# Patient Record
(demographics unavailable — no encounter records)

---

## 2017-03-18 NOTE — EDM.PDOC
ED HPI GENERAL MEDICAL PROBLEM





- General


Chief Complaint: ENT Problem


Stated Complaint: EAR INFECTION


Time Seen by Provider: 03/18/17 17:33





- History of Present Illness


INITIAL COMMENTS - FREE TEXT/NARRATIVE: 





HISTORY AND PHYSICAL:





History of present illness:


The patient is a 33 or female with a history of cough nonproductive of phlegm 

for one week some nasal drainage and sore throat all of which have improved but 

now she has bilateral ear pain left greater than right the last 2 days with 

episodic vomiting over the last 2-3 days. She is tolerating by mouth she states 

she only episodically vomiting she has no diarrhea or abdominal pain. She has 

no chest pain or shortness of breath. She has no headache or current sinus 

drainage and she is mostly concerned about bilateral ear pain. The patient has 

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 

in the past but does not want to have that surgically corrected. Patient denies 

any neck pain swelling or headache. Patient did not get her influenza shot this 

year but she does follow with Dr. De Dios at [facility].


The patient states she's not using protection and is unsure of last period and 

would like a pregnancy test


Review of systems: 


As per history of present illness and below otherwise all systems reviewed and 

negative.





Past medical history: 


As per history of present illness and as reviewed below otherwise 

noncontributory.





Surgical history: 


As per history of present illness and as reviewed below otherwise 

noncontributory.





Social history: 


No reported history of drug or alcohol abuse.





Family history: 


As per history of present illness and as reviewed below otherwise 

noncontributory.





Physical exam:


General: Well-developed well-nourished female who is overweight nontoxic and 

speaking clearly and easily in the ED. She is not breathless or should nasal 

quality to voice.


HEENT: Atraumatic, normocephalic, pupils reactive, negative for conjunctival 

pallor or scleral icterus, mucous membranes moist, throat clear, neck supple, 

nontender, trachea midline. There is no cervical adenopathy or nuchal rigidity. 

The right TM has a chronic appearing perforation in the superior aspect and 

there is no gross drainage or external canal swelling and the TM is not 

reddened. There is no mastoid tenderness bilaterally. The left TM is beefy-red 

in appearance and bulging but there is no fluid or debris in external canal nor 

is there any gross edema.


Lungs: Clear to auscultation, breath sounds equal bilaterally, chest nontender.


Heart: S1S2, regular rhythm and slightly tachycardic rate of my evaluation no 

overt murmurs


Abdomen: Soft, nondistended, nontender. NABS.


Genitourinary: Deferred.


Rectal: Deferred.


Extremities: Atraumatic, negative for cords or calf pain. Neurovascular 

unremarkable.


Neuro: Awake, alert, oriented. Cranial nerves II through XII unremarkable. 

Cerebellum unremarkable. Motor and sensory unremarkable throughout. Exam 

nonfocal.





Diagnostics:


Urine pregnancy test


I offered the patient an influenza swabbut as her symptoms are one week of 

duration I told her that I would not be able to treata + test  and she should 

like to defer





Therapeutics:








Impression: 


Left otitis media, chronic right TM perforation with URI symptoms





Definitive disposition and diagnosis as appropriate pending reevaluation and 

review of above.


Treatments PTA: Reports: NSAIDS


Other Treatments PTA: ibuprofen an hour ago


  ** bilateral ears


Pain Score (Numeric/FACES): 10





- Related Data


 Allergies











Allergy/AdvReac Type Severity Reaction Status Date / Time


 


Penicillins Allergy  Rash Verified 03/18/17 17:46











Home Meds: 


 Home Meds





Citalopram Hydrobromide [Celexa] 20 mg PO DAILY 12/29/16 [History]











Past Medical History


Other HEENT History: has upper dental bridge


Cardiovascular History: Reports: None


Respiratory History: Reports: None


Gastrointestinal History: Reports: None


Genitourinary History: Reports: None


OB/GYN History: Reports: Pregnancy


Musculoskeletal History: Reports: None


Neurological History: Reports: None


Psychiatric History: Reports: Anxiety, Depression


Endocrine/Metabolic History: Reports: Obesity/BMI 30+


Hematologic History: Reports: None


Immunologic History: Reports: None


Oncologic (Cancer) History: Reports: None


Dermatologic History: Reports: None





- Infectious Disease History


Infectious Disease History: Reports: None





- Past Surgical History


Head Surgeries/Procedures: Reports: None


HEENT Surgical History: Reports: Adenoidectomy, Tonsillectomy


Cardiovascular Surgical History: Reports: None


Respiratory Surgical History: Reports: None


GI Surgical History: Reports: None


Female  Surgical History: Reports: None


Endocrine Surgical History: Reports: None


Neurological Surgical History: Reports: None


Musculoskeletal Surgical History: Reports: None


Oncologic Surgical History: Reports: None





Social & Family History





- Family History


Family Medical History: Noncontributory





- Tobacco Use


Smoking Status *Q: Current Some Day Smoker


Years of Tobacco use: 15


Packs/Tins Daily: 0.5


Used Tobacco, but Quit: Yes





- Recreational Drug Use


Recreational Drug Use: No


Drug Use in Last 12 Months: No





ED ROS GENERAL





- Review of Systems


Review Of Systems: ROS reveals no pertinent complaints other than HPI.





ED EXAM, GENERAL





- Physical Exam


Exam: See Below (See dictation)





Course





- Vital Signs


Last Recorded V/S: 


 Last Vital Signs











Temp  36.8 C   03/18/17 17:48


 


Pulse  113 H  03/18/17 17:48


 


Resp  16   03/18/17 17:48


 


BP  119/70   03/18/17 17:48


 


Pulse Ox  96   03/18/17 17:48














- Orders/Labs/Meds


Orders: 


 Active Orders 24 hr











 Category Date Time Status


 


 HCG QUALITATIVE,URINE [URCHEM] Stat Lab  03/18/17 18:05 Ordered














Departure





- Departure


Time of Disposition: 18:21


Disposition: Home, Self-Care 01


Condition: good


Clinical Impression: 


Otitis media


Qualifiers:


 Otitis media type: unspecified Laterality: left Chronicity: unspecified 

Qualified Code(s): H66.92 - Otitis media, unspecified, left ear





Upper respiratory tract infection


Qualifiers:


 URI type: unspecified viral URI Qualified Code(s): J06.9 - Acute upper 

respiratory infection, unspecified





Referrals: 


Charles De Dios MD [Primary Care Provider] - 


Forms:  ED Department Discharge


Additional Instructions: 


The following information is given to patients seen in the emergency department 

who are being discharged to home. This information is to outline your options 

for follow-up care. We provide all patients seen in our emergency department 

with a follow-up referral.





The need for follow-up, as well as the timing and circumstances, are variable 

depending upon the specifics of your emergency department visit.





If you don't have a primary care physician on staff, we will provide you with a 

referral. We always advise you to contact your personal physician following an 

emergency department visit to inform them of the circumstance of the visit and 

for follow-up with them and/or the need for any referrals to a consulting 

specialist.





The emergency department will also refer you to a specialist when appropriate. 

This referral assures that you have the opportunity for followup care with a 

specialist. All of these measure are taken in an effort to provide you with 

optimal care, which includes your followup.





Under all circumstances we always encourage you to contact your private 

physician who remains a resource for coordinating  your care. When calling for 

followup care, please make the office aware that this follow-up is from your 

recent emergency room visit. If for any reason you are refused follow-up, 

please contact the [facility] emergency 

department at [phone] and ask to speak to the emergency department 

charge nurse.





[facility] 


Primary care- Internal Medicine and Family [address] [address]


[phone]





[address] [address] [phone]








Please followup with Dr. De Dios at [facility] or one of our providers for 

reevaluation and further care. Please take antibiotics as directed and return 

to ER as needed as discussed. Please take over-the-counter Tylenol/ibuprofen 

for pain and stronger pain medication at sleep times if needed





- My Orders


Last 24 Hours: 


My Active Orders





03/18/17 18:05


HCG QUALITATIVE,URINE [URCHEM] Stat 














- Assessment/Plan


Last 24 Hours: 


My Active Orders





03/18/17 18:05


HCG QUALITATIVE,URINE [URCHEM] Stat

## 2017-10-23 NOTE — PCM.SN
- Free Text/Narrative


Note: 


Called by nursing for precip twins delivery.  18g PIV started to Rt AC by me, 

secured with tape and tegaderm.  Present for both Twin A and Twin B delivery.

## 2017-10-24 NOTE — OR
SURGEON:

Roro Tony M.D.

 

DATE OF PROCEDURE:  10/23/2017

 

PREOPERATIVE DIAGNOSIS:

36 and 6/7th week intrauterine pregnancy, twin gestation, suspected cephalic

breech presentation.

 

POSTOPERATIVE DIAGNOSIS:

Monochorionic diamniotic twin gestation, cephalic/cephalic presentation, 

labor.

 

PROCEDURE:

Vaginal delivery of monochorionic diamniotic twins, cephalic/cephalic

presentation.

 

ANESTHESIA:

None.

 

ESTIMATED BLOOD LOSS:

Less than 200 mL.

 

FINDINGS:

Baby A was born cephalic, liveborn, Apgar scores 9 and 9, weighing 2240 g.  Baby

B was born cephalic, Apgar scores 9 and 9, weighing 1880 g.  Placenta was

delivered spontaneously, Schultze intact.  Both cords had 3 vessels.  The

placenta appeared to be monochorionic diamniotic and was sent to Pathology.  The

cord of twin B was sent with cord clamp.

 

COMPLICATIONS:

None known.

 

DISPOSITION:

Mother and babies are in LDRP, in good condition.

 

BRIEF HISTORY:

This is a 34-year-old female.  She is -0-0-2.  She began to have

contractions mid morning on 10/23/2017.  She noted the contractions to be much

more intense between 8 and 9:00 p.m.  She presented to Labor and Delivery.  She

had spontaneous rupture of membranes upon arrival at Labor and Delivery.  At

that point, she was 9 cm dilated.  I was notified, I came quickly to Labor and

Delivery, and she was 9+ cm with the head at a +3 station when I arrived.

 

DESCRIPTION OF PROCEDURE:

With the patient in dorsal lithotomy position, the patient pushed over 2

contractions to a 5+ station, at which time infant A was delivered spontaneously

and atraumatically over the perineum with support.  The infant was bulb

suctioned by nose and mouth.  The cord was clamped x2 and cut, and the infant

was handed to Dr. Cornelius who was in attendance at delivery.  The infant was a

liveborn female, Apgar scores 9 and 9, weighing 2240 g.  Vaginal exam revealed

twin B to be coming in cephalic presentation.  This was confirmed with a bedside

sono as the sono tech had not yet arrived at the hospital, and she was allowed

to labor over 3 contractions.  At this point, she felt intense pressure.

Artificial rupture of membranes was performed.  Clear blood-tinged fluid was

noted, and she was allowed to push, and delivered with support over the

perineum, infant B, who was a liveborn female with Apgar scores 9 and 9,

weighing 1880 g.  The infant B cord was clamped x2 and cut.  Cord blood had been

collected for infant A including arterial and venous blood gases as well as

routine cord blood sampling.  For infant B, venous sampling only was obtained.

Pitocin was initiated after delivery of the infant to assist with delivery of

the placenta, which was delivered spontaneously, Schultze intact, with the

appearance of a monochorionic diamniotic placenta.  Upon inspection of the

pelvis and perineum, there were no periurethral, vaginal sidewall, cervical,

rectal, or perineal lacerations.  EBL was less than 200 mL.  There were no known

complications.

 

Mother and babies are in LDRP, in good condition.

 

 

SUJIT COOPER

DD:  10/23/2017 21:59:31

DT:  10/24/2017 01:46:49

Job #:  [id number]

## 2017-10-24 NOTE — PCM.PNPP
<Ros Mccoy - Last Filed: 10/24/17 08:23>





- General Info


Date of Service: 10/24/17


Functional Status: Reports: Pain Controlled, Tolerating Diet, Ambulating, 

Urinating





- Review of Systems


General: Denies: Fever, Weakness, Fatigue


Pulmonary: Denies: Shortness of Breath, Pleuritic Chest Pain, Cough


Cardiovascular: Denies: Chest Pain, Palpitations, Dyspnea on Exertion


Gastrointestinal: Denies: Abdominal Pain


Genitourinary: Denies: Dysuria





- General Info


Date of Service: 10/24/17





- Patient Data


Vital Signs - Most Recent: 


 Last Vital Signs











Temp  37.0 C   10/23/17 23:30


 


Pulse  68   10/23/17 23:30


 


Resp  18   10/23/17 23:30


 


BP  134/79   10/23/17 23:30


 


Pulse Ox  98   10/23/17 23:30











Weight - Most Recent: 83.461 kg


I&O - Last 24 Hours: 


 Intake & Output











 10/23/17 10/24/17 10/24/17





 22:59 06:59 14:59


 


Intake Total 500 500 


 


Balance 500 500 











Lab Results - Last 24 Hours: 


 Laboratory Results - last 24 hr











  10/23/17 10/23/17 10/24/17 Range/Units





  20:47 20:47 05:10 


 


WBC  17.21 H    (4.0-11.0)  K/uL


 


RBC  3.80 L    (4.30-5.90)  M/uL


 


Hgb  12.6   10.9 L  (12.0-16.0)  g/dL


 


Hct  36.4   32.2 L  (36.0-46.0)  %


 


MCV  95.8    (80.0-98.0)  fL


 


MCH  33.2 H    (27.0-32.0)  pg


 


MCHC  34.6    (31.0-37.0)  g/dL


 


RDW Std Deviation  44.6    (28.0-62.0)  fl


 


RDW Coeff of Yamile  13    (11.0-15.0)  %


 


Plt Count  244    (150-400)  K/uL


 


MPV  10.50    (7.40-12.00)  fL


 


Nucleated RBC %  0.0    /100WBC


 


Nucleated RBCs #  0    K/uL


 


Blood Type   O POSITIVE   


 


Antibody Screen   NEGATIVE   











Med Orders - Current: 


 Current Medications





Acetaminophen (Tylenol Extra Strength)  1,000 mg PO Q4H PRN


   PRN Reason: Pain


Benzocaine/Menthol (Dermoplast Pain Relief 20%-0.5% Spray)  0 gm TOP ASDIRECTED 

PRN


   PRN Reason: Perineal Comfort Measure


   Last Admin: 10/23/17 21:53 Dose:  1 canister


Bisacodyl (Dulcolax)  10 mg RECTAL .ONCE PRN


   PRN Reason: Constipation


Docusate Sodium (Colace)  100 mg PO BID PRN


   PRN Reason: Constipation


Emollient Ointment (Lansinoh Hpa)  0 gm TOP ASDIRECTED PRN


   PRN Reason: Sore Nipples


   Last Admin: 10/23/17 21:53 Dose:  1 tube


Ibuprofen (Motrin)  800 mg PO Q6H PRN


   PRN Reason: Pain


   Last Admin: 10/23/17 21:54 Dose:  800 mg


Methylergonovine Maleate (Methergine)  0.2 mg IM .ONCE PRN


   PRN Reason: Excessive Vaginal Bleeding


Oxycodone HCl (Oxycodone)  5 mg PO Q2H PRN


   PRN Reason: Pain


Witch Hazel (Tucks)  1 pad TOP ASDIRECTED PRN


   PRN Reason: comfort care


   Last Admin: 10/23/17 21:52 Dose:  1 tub





Discontinued Medications





Butorphanol Tartrate (Stadol)  1 mg IVPUSH Q1H PRN


   PRN Reason: Pain


Carboprost Tromethamine (Hemabate Ds)  250 mcg IM ASDIRECTED PRN


   PRN Reason: Post Partum Hemorrhage


Lactated Ringer's (Ringers, Lactated)  1,000 mls @ 150 mls/hr IV ASDIRECTED [facility]


Oxytocin/Sodium Chloride (Oxytocin 30 Unit/500 Ml-Ns)  30 unit in 500 mls @ 999 

mls/hr IV TITRATE [facility]


   Last Admin: 10/23/17 21:39 Dose:  999 mls/hr


Oxytocin/Sodium Chloride (Oxytocin 30 Unit/500 Ml-Ns) Confirm Administered Dose 

30 unit in 500 mls @ as directed .ROUTE .[facility]


   Stop: 10/23/17 21:33


Lidocaine HCl (Xylocaine 1%)  50 ml INJECT .ONCE PRN


   PRN Reason: Laceration repair


Methylergonovine Maleate (Methergine)  0.2 mg IM ASDIRECTED PRN


   PRN Reason: Post Partum Hemorrhage


   Last Admin: 10/23/17 22:08 Dose:  0.2 mg


Misoprostol (Cytotec)  200 mcg PO .ONCE PRN


   PRN Reason: Post Partum Hemorrhage


Nalbuphine HCl (Nubain)  10 mg IVPUSH Q1H PRN


   PRN Reason: Pain (severe 7-10)


Sodium Chloride (Saline Flush)  10 ml FLUSH ASDIRECTED PRN


   PRN Reason: Keep Vein Open


Sodium Chloride (Saline Flush)  2.5 ml FLUSH ASDIRECTED PRN


   PRN Reason: Keep Vein Open


Sterile Water (Sterile Water For Irrigation)  1,000 ml IRR ASDIRECTED PRN


   PRN Reason: delivery


   Last Admin: 10/23/17 20:48 Dose:  1,000 ml











- Infant Interaction


Infant Disposition, Postpartum:  in Room with Family


Infant Interaction: Holding Infant


Infant Feeding: Attempted Breastfeeding; Nursed Fair/Poor, Bottle Fed Infant





- Postpartum Recovery Exam


Fundal Tone: Firm


Fundal Level: At Umbilicus


Fundal Placement: Midline


Lochia Amount: Small


Lochia Color: Rubra/Red


Perineum Description: Intact, Minimal Bruising/Swelling


Episiotomy/Laceration: None


Bladder Status: Voiding


Urinary Elimination: Voided





- Exam


General: Alert, Oriented


Neck: Supple


Lungs: Clear to Auscultation, Normal Respiratory Effort


Cardiovascular: Regular Rate, Regular Rhythm


GI/Abdominal Exam: Normal Bowel Sounds, Soft, Non-Tender, No Organomegaly, No 

Distention, No Abnormal Bruit, No Mass, Pelvis Stable


Extremities: Normal Inspection, Normal Range of Motion, Non-Tender (trace), 

Normal Capillary Refill, Pedal Edema





- Problem List & Annotations


(1) Twin gestation, monochorionic diamniotic


SNOMED Code(s): 00187185


   Code(s): O30.039 - TWIN PREGNANCY, MONOCHORIONIC/DIAMNIOTIC, UNSP TRIMESTER 

  Status: Acute   Current Visit: Yes   


  QualifierTitle:    Trimester: third trimester   Qualified Code(s): O30.033 - 

Twin pregnancy, monochorionic/diamniotic, third trimester   





(2) Twin pregnancy delivered vaginally


SNOMED Code(s): 931362684


   Code(s): O30.009 - TWIN PREGNANCY, UNSP NUM PLCNTA & AMNIO SACS, UNSP 

TRIMESTER   Status: Acute   Current Visit: Yes   





- Problem List Review


Problem List Initiated/Reviewed/Updated: Yes





- Assessment


Assessment:: 


PPD#1 from  of mono/di twin gestation. Minimal pain and lochia. Work on 

breast feeding today. Anticipate discharge home tomorrow.








- Plan


Plan:: 


Continue routine post-partum cares. Aim for discharge home tomorrow. 








<Azra Maria - Last Filed: 10/24/17 10:00>





- Patient Data


Vital Signs - Most Recent: 


 Last Vital Signs











Temp  37.0 C   10/23/17 23:30


 


Pulse  68   10/23/17 23:30


 


Resp  18   10/23/17 23:30


 


BP  134/79   10/23/17 23:30


 


Pulse Ox  98   10/23/17 23:30











I&O - Last 24 Hours: 


 Intake & Output











 10/23/17 10/24/17 10/24/17





 22:59 06:59 14:59


 


Intake Total 500 500 


 


Balance 500 500 











Lab Results - Last 24 Hours: 


 Laboratory Results - last 24 hr











  10/23/17 10/23/17 10/24/17 Range/Units





  20:47 20:47 05:10 


 


WBC  17.21 H    (4.0-11.0)  K/uL


 


RBC  3.80 L    (4.30-5.90)  M/uL


 


Hgb  12.6   10.9 L  (12.0-16.0)  g/dL


 


Hct  36.4   32.2 L  (36.0-46.0)  %


 


MCV  95.8    (80.0-98.0)  fL


 


MCH  33.2 H    (27.0-32.0)  pg


 


MCHC  34.6    (31.0-37.0)  g/dL


 


RDW Std Deviation  44.6    (28.0-62.0)  fl


 


RDW Coeff of Yamile  13    (11.0-15.0)  %


 


Plt Count  244    (150-400)  K/uL


 


MPV  10.50    (7.40-12.00)  fL


 


Nucleated RBC %  0.0    /100WBC


 


Nucleated RBCs #  0    K/uL


 


Blood Type   O POSITIVE   


 


Antibody Screen   NEGATIVE   











Med Orders - Current: 


 Current Medications





Acetaminophen (Tylenol Extra Strength)  1,000 mg PO Q4H PRN


   PRN Reason: Pain


Benzocaine/Menthol (Dermoplast Pain Relief 20%-0.5% Spray)  0 gm TOP ASDIRECTED 

PRN


   PRN Reason: Perineal Comfort Measure


   Last Admin: 10/23/17 21:53 Dose:  1 canister


Bisacodyl (Dulcolax)  10 mg RECTAL .ONCE PRN


   PRN Reason: Constipation


Docusate Sodium (Colace)  100 mg PO BID PRN


   PRN Reason: Constipation


Emollient Ointment (Lansinoh Hpa)  0 gm TOP ASDIRECTED PRN


   PRN Reason: Sore Nipples


   Last Admin: 10/23/17 21:53 Dose:  1 tube


Ibuprofen (Motrin)  800 mg PO Q6H PRN


   PRN Reason: Pain


   Last Admin: 10/23/17 21:54 Dose:  800 mg


Methylergonovine Maleate (Methergine)  0.2 mg IM .ONCE PRN


   PRN Reason: Excessive Vaginal Bleeding


Oxycodone HCl (Oxycodone)  5 mg PO Q2H PRN


   PRN Reason: Pain


Witch Hazel (Tucks)  1 pad TOP ASDIRECTED PRN


   PRN Reason: comfort care


   Last Admin: 10/23/17 21:52 Dose:  1 tub





Discontinued Medications





Butorphanol Tartrate (Stadol)  1 mg IVPUSH Q1H PRN


   PRN Reason: Pain


Carboprost Tromethamine (Hemabate Ds)  250 mcg IM ASDIRECTED PRN


   PRN Reason: Post Partum Hemorrhage


Lactated Ringer's (Ringers, Lactated)  1,000 mls @ 150 mls/hr IV ASDIRECTED [facility]


Oxytocin/Sodium Chloride (Oxytocin 30 Unit/500 Ml-Ns)  30 unit in 500 mls @ 999 

mls/hr IV TITRATE [facility]


   Last Admin: 10/23/17 21:39 Dose:  999 mls/hr


Oxytocin/Sodium Chloride (Oxytocin 30 Unit/500 Ml-Ns) Confirm Administered Dose 

30 unit in 500 mls @ as directed .ROUTE .[facility]


   Stop: 10/23/17 21:33


Lidocaine HCl (Xylocaine 1%)  50 ml INJECT .ONCE PRN


   PRN Reason: Laceration repair


Methylergonovine Maleate (Methergine)  0.2 mg IM ASDIRECTED PRN


   PRN Reason: Post Partum Hemorrhage


   Last Admin: 10/23/17 22:08 Dose:  0.2 mg


Misoprostol (Cytotec)  200 mcg PO .ONCE PRN


   PRN Reason: Post Partum Hemorrhage


Nalbuphine HCl (Nubain)  10 mg IVPUSH Q1H PRN


   PRN Reason: Pain (severe 7-10)


Sodium Chloride (Saline Flush)  10 ml FLUSH ASDIRECTED PRN


   PRN Reason: Keep Vein Open


Sodium Chloride (Saline Flush)  2.5 ml FLUSH ASDIRECTED PRN


   PRN Reason: Keep Vein Open


Sterile Water (Sterile Water For Irrigation)  1,000 ml IRR ASDIRECTED PRN


   PRN Reason: delivery


   Last Admin: 10/23/17 20:48 Dose:  1,000 ml











- Plan


Plan:: 





Patient seen and examined--agree with above.

## 2017-10-25 NOTE — PCM.PNPP
- General Info


Date of Service: 10/25/17


Functional Status: Reports: Pain Controlled, Tolerating Diet, Ambulating, 

Urinating





- Review of Systems


General: Denies: Fever, Weakness


Pulmonary: Denies: Shortness of Breath


Cardiovascular: Denies: Chest Pain, Palpitations, Lightheadedness


Gastrointestinal: Denies: Abdominal Pain, Nausea, Vomiting


Genitourinary: Denies: Flank Pain


Psychiatric: Reports: No Symptoms





- General Info


Date of Service: 10/25/17





- Patient Data


Vital Signs - Most Recent: 


 Last Vital Signs











Temp  37.1 C   10/25/17 04:00


 


Pulse  98   10/25/17 04:00


 


Resp  15   10/25/17 04:00


 


BP  127/72   10/25/17 04:00


 


Pulse Ox  98   10/25/17 04:00











Weight - Most Recent: 83.461 kg


Med Orders - Current: 


 Current Medications





Acetaminophen (Tylenol Extra Strength)  1,000 mg PO Q4H PRN


   PRN Reason: Pain


   Last Admin: 10/25/17 03:57 Dose:  1,000 mg


Benzocaine/Menthol (Dermoplast Pain Relief 20%-0.5% Spray)  0 gm TOP ASDIRECTED 

PRN


   PRN Reason: Perineal Comfort Measure


   Last Admin: 10/23/17 21:53 Dose:  1 canister


Bisacodyl (Dulcolax)  10 mg RECTAL .ONCE PRN


   PRN Reason: Constipation


Docusate Sodium (Colace)  100 mg PO BID PRN


   PRN Reason: Constipation


Emollient Ointment (Lansinoh Hpa)  0 gm TOP ASDIRECTED PRN


   PRN Reason: Sore Nipples


   Last Admin: 10/23/17 21:53 Dose:  1 tube


Ibuprofen (Motrin)  800 mg PO Q6H PRN


   PRN Reason: Pain


   Last Admin: 10/23/17 21:54 Dose:  800 mg


Methylergonovine Maleate (Methergine)  0.2 mg IM .ONCE PRN


   PRN Reason: Excessive Vaginal Bleeding


Oxycodone HCl (Oxycodone)  5 mg PO Q2H PRN


   PRN Reason: Pain


Witch Hazel (Tucks)  1 pad TOP ASDIRECTED PRN


   PRN Reason: comfort care


   Last Admin: 10/23/17 21:52 Dose:  1 tub





Discontinued Medications





Butorphanol Tartrate (Stadol)  1 mg IVPUSH Q1H PRN


   PRN Reason: Pain


Carboprost Tromethamine (Hemabate Ds)  250 mcg IM ASDIRECTED PRN


   PRN Reason: Post Partum Hemorrhage


Lactated Ringer's (Ringers, Lactated)  1,000 mls @ 150 mls/hr IV ASDIRECTED PRATEEK


Oxytocin/Sodium Chloride (Oxytocin 30 Unit/500 Ml-Ns)  30 unit in 500 mls @ 999 

mls/hr IV TITRATE PRATEEK


   Last Admin: 10/23/17 21:39 Dose:  999 mls/hr


Oxytocin/Sodium Chloride (Oxytocin 30 Unit/500 Ml-Ns) Confirm Administered Dose 

30 unit in 500 mls @ as directed .ROUTE .[facility]


   Stop: 10/23/17 21:33


   Last Admin: 10/24/17 17:09 Dose:  Not Given


Lidocaine HCl (Xylocaine 1%)  50 ml INJECT .ONCE PRN


   PRN Reason: Laceration repair


Methylergonovine Maleate (Methergine)  0.2 mg IM ASDIRECTED PRN


   PRN Reason: Post Partum Hemorrhage


   Last Admin: 10/23/17 22:08 Dose:  0.2 mg


Misoprostol (Cytotec)  200 mcg PO .ONCE PRN


   PRN Reason: Post Partum Hemorrhage


Nalbuphine HCl (Nubain)  10 mg IVPUSH Q1H PRN


   PRN Reason: Pain (severe 7-10)


Sodium Chloride (Saline Flush)  10 ml FLUSH ASDIRECTED PRN


   PRN Reason: Keep Vein Open


Sodium Chloride (Saline Flush)  2.5 ml FLUSH ASDIRECTED PRN


   PRN Reason: Keep Vein Open


Sterile Water (Sterile Water For Irrigation)  1,000 ml IRR ASDIRECTED PRN


   PRN Reason: delivery


   Last Admin: 10/23/17 20:48 Dose:  1,000 ml











- Infant Interaction


Infant Disposition, Postpartum:  in Room with Family


Infant Interaction: Holding Infant


Infant Feeding: Attempted Breastfeeding; Nursed Fair/Poor, Bottle Fed Infant





- Postpartum Recovery Exam


Fundal Tone: Firm


Fundal Level: At Umbilicus


Fundal Placement: Midline


Lochia Amount: Scant


Lochia Color: Rubra/Red


Perineum Description: Intact, Minimal Bruising/Swelling


Episiotomy/Laceration: None


Bladder Status: Voiding


Urinary Elimination: Voided





- Exam


General: Alert, Oriented


Lungs: Normal Respiratory Effort


Cardiovascular: Regular Rate, Regular Rhythm


GI/Abdominal Exam: Soft, Non-Tender


Extremities: Non-Tender, Pedal Edema (trace)


Psy/Mental Status: Alert, Normal Affect





- Problem List Review


Problem List Initiated/Reviewed/Updated: Yes





- My Orders


Last 24 Hours: 


My Active Orders





10/25/17 09:07


Ready for Discharge [RC] PER UNIT ROUTINE 














- Assessment


Assessment:: 


PPD#2 from  of mono/di twin gestation. Minimal pain and lochia. 





- Plan


Plan:: 





Doing well overall, would like to be discharged today.  Infection and bleeding 

warnings reviewed. Discharge instructions reviewed. Follow up at [facility] 6 weeks. 

Discharge to home today.